# Patient Record
Sex: FEMALE | Race: WHITE
[De-identification: names, ages, dates, MRNs, and addresses within clinical notes are randomized per-mention and may not be internally consistent; named-entity substitution may affect disease eponyms.]

---

## 2017-04-01 NOTE — EDM.PDOC
ED HPI GI/ABDOMINAL





- General


Chief Complaint: Gastrointestinal Problem


Stated Complaint: SOB, FLU SYMPTOMS, DIZZY


Time Seen by Provider: 17 11:06


Source: Reports: Patient, Family, RN notes reviewed


History Limitations: Reports: No limitations





- History of Present Illness


INITIAL COMMENTS - FREE TEXT/NARRATIVE: 





76-year-old female presents emergency department a complaint of diarrhea she's 

been ill for the last 4 days has had loose watery stools on and off been 

feverish initially had upper respiratory type symptoms but that now has 

resolved her biggest concern is dehydration





- Related Data


Allergies/ADRs: 


 Allergies











Allergy/AdvReac Type Severity Reaction Status Date / Time


 


gluten Allergy  Diarrhea Verified 16 09:39


 


ibuprofen [From Motrin] Allergy  Hives Verified 16 09:39


 


Iodinated Contrast Media - Allergy  Hives Verified 16 09:39





Oral and     





[Iodinated Contrast Media -     





IV Dye]     


 


nifedipine [From Procardia] Allergy  Redness Verified 16 09:39











Home Meds: 


 Home Meds





Acetaminophen/Diphenhydramine [Tylenol Pm Ex-Strength Caplet] 1 tab PO BEDTIME 

PRN 16 [History]


Bismuth Subsalicylate [Pepto-Bismol To-Go] 2 tab PO ASDIRECTED PRN 16 [

History]


Calcium Carbonate/Vitamin D3 [Calcium 500 + Vit D 400] 1 tab PO BID 16 [

History]


Cholecalciferol (Vitamin D3) [Vitamin D3] 1,000 units PO DAILY 16 [History

]


Clopidogrel [Plavix] 75 mg PO QPM 16 [History]


Flaxseed 2 tbsp PO DAILY 16 [History]


Loperamide [Imodium] 2 mg PO ASDIRECTED PRN 16 [History]


Multivitamin [Multivitamins] 1 cap PO QPM 16 [History]


Simvastatin [Zocor] 40 mg PO QPM 16 [History]


Triamcinolone Acetonide 1 applic TOP BID 16 [History]


Triamterene/Hydrochlorothiazid [Triamterene-HCTZ 37.5-25 MG] 1 tab-cap PO DAILY 

16 [History]


Pantoprazole Sodium [Protonix] 40 mg PO BID 16 [History]











Past Medical History


HEENT History: Reports: Cataract, Impaired vision, Other (see below)


Other HEENT History: left hear decreased hearing


Cardiovascular History: Reports: High cholesterol, Hypertension, SOB on exertion

, Other (see below)


Other Cardiovascular History: skip beat now and then rhythm issue, dependent 

cyanosis of feet bilat- no dx of PVD


Gastrointestinal History: Reports: Cholelithiasis, Chronic diarrhea, GERD, 

Hemorrhoids, Other (see below)


Other Gastrointestinal History: microscopic collitis- unsure which of 2 types


OB/GYN History: Reports: Pregnancy, Spontaneous 


Musculoskeletal History: Reports: Arthritis


Neurological History: Reports: Migraines, TIA


Endocrine/Metabolic History: Reports: Vitamin D deficiency, Other (see below)


Other Endocrine/Metabolic History: prediabetic per pt.


Oncologic (Cancer) History: Reports: Squamous cell carcinoma, Other (see below)


Other Oncologic History: basal cell, abnormal pap and hsterectomy (unkown if CA)


Dermatologic History: Reports: Urticaria, Other (see below)


Other Dermatologic History: unknown skin issue on head that comes and goes





- Infectious Disease History


Infectious Disease History: Reports: Measles, Pertussis (whooping cough)





- Past Surgical History


HEENT Surgical History: Reports: Cataract surgery


Cardiovascular Surgical History: Reports: None


GI Surgical History: Reports: Appendectomy, Cholecystectomy, Colonoscopy, EGD


Female  Surgical History: Reports: Hysterectomy


Neurological Surgical History: Reports: None


Musculoskeletal Surgical History: Reports: Other (see below)


Other Musculoskeletal Surgeries/Procedures:: hand surgery both pikey fingers


Dermatological Surgical History: Reports: Other (see below)





Social & Family History





- Family History


Cardiac: Reports: Bypass, [place]


Endocrine/Metabolic: Reports: IDDM





- Tobacco Use


Smoking Status *Q: Never Smoker





- Alcohol Use


Days Per Week of Alcohol Use: 5


Number of Drinks Per Day: 2


Total Drinks Per Week: 10





- Recreational Drug Use


Recreational Drug Use: No





ED ROS GENERAL





- Review of Systems


Review Of Systems: See Below


Constitutional: Reports: fever


HEENT: Reports: No symptoms


Respiratory: Reports: No Symptoms


Cardiovascular: Reports: No symptoms


GI/Abdominal: Reports: Diarrhea.  Denies: Nausea, Vomiting


: Reports: no symptoms


Musculoskeletal: Reports: no symptoms


Skin: Reports: no symptoms





ED EXAM, GI/ABD





- Physical Exam


Exam: See Below


Text/Narrative:: 





General: Female, not in any distress, alert and oriented x3 HEENT: head is 

atraumatic normocephalic, eyes pupils equal round reactive to light, sclera 

clear no conjunctivitis appreciated.  Ears tympanic membrane PE tube in place 

open and functioning on the left, and brain clear gray on the right.  Nose no 

septal deviation, nares are clear, no blood present.  Mouth mucosa is moist and 

pink no erythema or exudate noted in soft palate, tongue is midline uvula is 

midline, dentition is intact.


Neck: Supple no thyromegaly no tracheal deviation.


Nodes: Cervical nodes subclavicular nodes nontender no palpable lymphadenopathy 

noted.


Lungs: clear to auscultation bilaterally with symmetrical respirations, no 

adventitious noise appreciated.


CV: Regular rate and rhythm S1 and S2 appreciated no murmurs rubs or gallops 

noted.


Abdomen: Soft, nontender, no palpable masses or organomegaly appreciated, no 

distention no guarding bowel sounds are present,  .


Neuro: Cranial nerves II through XII grossly intact


Skin: Warm and dry, intact


Extremities: No lower extremity edema appreciated.





Course





- Vital Signs


Last Recorded V/S: 


 Last Vital Signs











Temp  98.1 F   17 10:26


 


Pulse  64   17 14:50


 


Resp  22 H  17 14:50


 


BP  162/105 H  17 14:50


 


Pulse Ox  97   17 14:50














- Orders/Labs/Meds


Orders: 


 Active Orders 24 hr











 Category Date Time Status


 


 Peripheral IV Care [RC] .AS DIRECTED Care  17 12:38 Active


 


 CLOSTRIDIUM DIFFICILE BY PCR [RM] Stat Lab  17 14:20 Ordered


 


 CULTURE STOOL + SHIGATOX [RM] Stat Lab  17 14:21 Ordered


 


 UA W/MICROSCOPIC [URIN] Urgent Lab  17 14:47 Uncollected


 


 WBC, STOOL [OP] Stat Lab  17 14:21 Ordered


 


 Sodium Chloride 0.9% [Normal Saline] 1,000 ml Med  17 14:00 Active





 IV ASDIRECTED   


 


 Sodium Chloride 0.9% [Saline Flush] Med  17 12:38 Active





 10 ml FLUSH ASDIRECTED PRN   


 


 Peripheral IV Insertion Adult [OM.PC] Urgent Oth  17 12:38 Ordered








 Medication Orders





Sodium Chloride (Normal Saline)  1,000 mls @ 500 mls/hr IV ASDIRECTED SKY


   Last Admin: 17 14:17  Dose: 500 mls/hr


Sodium Chloride (Saline Flush)  10 ml FLUSH ASDIRECTED PRN


   PRN Reason: Keep Vein Open


   Last Admin: 17 12:49  Dose: 10 ml








Labs: 


 Laboratory Tests











  17 Range/Units





  11:33 11:33 


 


WBC   7.5  (4.5-11.0)  K/uL


 


RBC   4.69  (3.30-5.50)  M/uL


 


Hgb   15.4 H  (12.0-15.0)  g/dL


 


Hct   44.2  (36.0-48.0)  %


 


MCV   94  (80-98)  fL


 


MCH   33 H  (27-31)  pg


 


MCHC   35  (32-36)  %


 


Plt Count   157  (150-400)  K/uL


 


Neut % (Auto)   56  (36-66)  %


 


Lymph % (Auto)   31  (24-44)  %


 


Mono % (Auto)   12 H  (2-6)  %


 


Eos % (Auto)   1 L  (2-4)  %


 


Baso % (Auto)   1  (0-1)  %


 


Sodium  135 L   (140-148)  mmol/L


 


Potassium  3.7   (3.6-5.2)  mmol/L


 


Chloride  98 L   (100-108)  mmol/L


 


Carbon Dioxide  26   (21-32)  mmol/L


 


Anion Gap  14.7 H   (5.0-14.0)  mmol/L


 


BUN  16   (7-18)  mg/dL


 


Creatinine  1.1 H   (0.6-1.0)  mg/dL


 


Est Cr Clr Drug Dosing  39.46   mL/min


 


Estimated GFR (MDRD)  48 L   (>60)  


 


Glucose  107 H   ()  mg/dL


 


Calcium  8.6   (8.5-10.1)  mg/dL


 


Total Bilirubin  0.3   (0.2-1.0)  mg/dL


 


AST  21   (15-37)  U/L


 


ALT  23   (12-78)  U/L


 


Alkaline Phosphatase  52   ()  U/L


 


Total Protein  7.3   (6.4-8.2)  g/dL


 


Albumin  3.7   (3.4-5.0)  g/dL


 


Globulin  3.6 H   (2.3-3.5)  g/dL


 


Albumin/Globulin Ratio  1.0 L   (1.2-2.2)  











Meds: 


Medications











Generic Name Dose Route Start Last Admin





  Trade Name [place]  PRN Reason Stop Dose Admin


 


Sodium Chloride  1,000 mls @ 500 mls/hr  17 14:00  17 14:17





  Normal Saline  IV   500 mls/hr





  ASDIRECTED SKY   Administration


 


Sodium Chloride  10 ml  17 12:38  17 12:49





  Saline Flush  FLUSH   10 ml





  ASDIRECTED PRN   Administration





  Keep Vein Open   














Discontinued Medications














Generic Name Dose Route Start Last Admin





  Trade Name [place]uzy  PRN Reason Stop Dose Admin


 


Sodium Chloride  1,000 mls @ 999 mls/hr  17 12:38  17 12:49





  Normal Saline  IV  17 13:38  999 mls/hr





  .BOLUS ONE   Administration














Departure





- Departure


Time of Disposition: 15:34


Disposition: Admitted As Inpatient 64


Condition: fair


Clinical Impression: 


 Weakness





Diarrhea


Qualifiers:


 Diarrhea type: presumed infectious Qualified Code(s): A09 - Infectious 

gastroenteritis and colitis, unspecified





Forms:  ED Department Discharge





- My Orders


Last 24 Hours: 


My Active Orders





17 12:38


Peripheral IV Care [RC] .AS DIRECTED 


Sodium Chloride 0.9% [Saline Flush]   10 ml FLUSH ASDIRECTED PRN 


Peripheral IV Insertion Adult [OM.PC] Urgent 





17 14:00


Sodium Chloride 0.9% [Normal Saline] 1,000 ml IV ASDIRECTED 





17 14:20


CLOSTRIDIUM DIFFICILE BY PCR [RM] Stat 





17 14:21


CULTURE STOOL + SHIGATOX [RM] Stat 


WBC, STOOL [OP] Stat 





17 14:47


UA W/MICROSCOPIC [URIN] Urgent 














- Assessment/Plan


Last 24 Hours: 


My Active Orders





17 12:38


Peripheral IV Care [RC] .AS DIRECTED 


Sodium Chloride 0.9% [Saline Flush]   10 ml FLUSH ASDIRECTED PRN 


Peripheral IV Insertion Adult [OM.PC] Urgent 





17 14:00


Sodium Chloride 0.9% [Normal Saline] 1,000 ml IV ASDIRECTED 





17 14:20


CLOSTRIDIUM DIFFICILE BY PCR [RM] Stat 





17 14:21


CULTURE STOOL + SHIGATOX [RM] Stat 


WBC, STOOL [OP] Stat 





17 14:47


UA W/MICROSCOPIC [URIN] Urgent 











Plan: 





Assessment





Acuity = acute





Site and laterality = gastroenteritis





Etiology  = suspect viral cause





Manifestations = severe diarrhea, weakness





Location of injury =  home





Lab values = sodium of 135 consistent hyponatremia creatinine elevated at 1.1 

consistent acute renal failure stage GIIIB, stool culture and urine culture 

awaiting sample





Plan


discussed the case with hospitalist on call he agreed to come and evaluate the 

patient in the for further evaluation admission, she is so weak she cannot 

ambulate around the ED

















Patient was in agreement with the plan all questions were answered   This note 

was dictated using dragon voice recognition software please call with any 

questions.

## 2017-04-01 NOTE — PCM.HP
H&P History of Present Illness





- General


Date of Service: 17


Admit Problem/Dx: 


 Admission Diagnosis/Problem





Admission Diagnosis/Problem      Diarrhea








Source of Information: Patient, Family, Provider


History Limitations: Reports: No limitations





- History of Present Illness


Initial Comments - Free Text/Narative: 





Leonarda presents to the emergency room with diarrhea for 2 days and weakness.  

She reports initial onset of symptoms including mild sore throat and nasal 

congestion with mild cough.  Later in the day she developed diarrhea and has 

had diarrhea for the past 2 days.  She thinks that she has had 7 watery bowel 

movements during that time.  She has had very little to eat or drink and has 

become progressively weak.  She has a mild frontal headache that is achy in 

nature.  She hasn't taken anything at home to make it feel better.  Pain has 

been present and essentially unchanged for the last 2 days.  No obvious 

triggers to make it worse.  She's not aware of any sick contacts.  She has had 

subjective fevers and chills but has not measured any temperatures.  No 

dominant pain, nausea or vomiting.


Workup in the emergency room has been reassuring.  She is too weak to be safe 

for outpatient management so she will be admitted for hydration and observation.








- Related Data


Allergies/Adverse Reactions: 


 Allergies











Allergy/AdvReac Type Severity Reaction Status Date / Time


 


gluten Allergy  Diarrhea Verified 16 09:39


 


ibuprofen [From Motrin] Allergy  Hives Verified 16 09:39


 


Iodinated Contrast Media - Allergy  Hives Verified 16 09:39





Oral and     





[Iodinated Contrast Media -     





IV Dye]     


 


nifedipine [From Procardia] Allergy  Redness Verified 16 09:39











Home Medications: 


 Home Meds





Acetaminophen/Diphenhydramine [Tylenol Pm Ex-Strength Caplet] 1 tab PO BEDTIME 

PRN 16 [History]


Bismuth Subsalicylate [Pepto-Bismol To-Go] 2 tab PO ASDIRECTED PRN 16 [

History]


Calcium Carbonate/Vitamin D3 [Calcium 500 + Vit D 400] 1 tab PO BID 16 [

History]


Cholecalciferol (Vitamin D3) [Vitamin D3] 1,000 units PO DAILY 16 [History

]


Clopidogrel [Plavix] 75 mg PO QPM 16 [History]


Flaxseed 2 tbsp PO DAILY 16 [History]


Loperamide [Imodium] 2 mg PO ASDIRECTED PRN 16 [History]


Multivitamin [Multivitamins] 1 cap PO QPM 16 [History]


Simvastatin [Zocor] 40 mg PO QPM 16 [History]


Triamcinolone Acetonide 1 applic TOP BID 16 [History]


Triamterene/Hydrochlorothiazid [Triamterene-HCTZ 37.5-25 MG] 1 tab-cap PO DAILY 

16 [History]


Pantoprazole Sodium [Protonix] 40 mg PO BID 16 [History]











Past Medical History


HEENT History: Reports: Cataract, Impaired vision, Other (see below)


Other HEENT History: left hear decreased hearing


Cardiovascular History: Reports: High cholesterol, Hypertension, SOB on exertion

, Other (see below)


Other Cardiovascular History: skip beat now and then rhythm issue, dependent 

cyanosis of feet bilat- no dx of PVD


Gastrointestinal History: Reports: Cholelithiasis, Chronic diarrhea, GERD, 

Hemorrhoids, Other (see below)


Other Gastrointestinal History: microscopic collitis- unsure which of 2 types


OB/GYN History: Reports: Pregnancy, Spontaneous 


Musculoskeletal History: Reports: Arthritis


Neurological History: Reports: Migraines, TIA


Endocrine/Metabolic History: Reports: Vitamin D deficiency, Other (see below)


Other Endocrine/Metabolic History: prediabetic per pt.


Oncologic (Cancer) History: Reports: Squamous cell carcinoma, Other (see below)


Other Oncologic History: basal cell, abnormal pap and hsterectomy (unkown if CA)


Dermatologic History: Reports: Urticaria, Other (see below)


Other Dermatologic History: unknown skin issue on head that comes and goes





- Infectious Disease History


Infectious Disease History: Reports: Measles, Pertussis (whooping cough)





- Past Surgical History


HEENT Surgical History: Reports: Cataract surgery


Cardiovascular Surgical History: Reports: None


GI Surgical History: Reports: Appendectomy, Cholecystectomy, Colonoscopy, EGD


Female  Surgical History: Reports: Hysterectomy


Neurological Surgical History: Reports: None


Musculoskeletal Surgical History: Reports: Other (see below)


Other Musculoskeletal Surgeries/Procedures:: hand surgery both pikey fingers


Dermatological Surgical History: Reports: Other (see below)





Social & Family History





- Family History


Cardiac: Reports: Bypass, [place]


Endocrine/Metabolic: Reports: IDDM





- Tobacco Use


Smoking Status *Q: Never Smoker





- Alcohol Use


Days Per Week of Alcohol Use: 5


Number of Drinks Per Day: 2


Total Drinks Per Week: 10





- Recreational Drug Use


Recreational Drug Use: No





H&P Review of Systems





- Review of Systems:


Review Of Systems: See Below


Free Text/Narrative: 





A complete 12 point review of systems was obtained.  Pertinent positives and 

negatives are noted in the history of present illness.  All other systems were 

reviewed and were negative except as noted.





Exam





- Exam


Exam: See Below





- Vital Signs


Vital Signs: 


 Last Vital Signs











Temp  36.7 C   17 10:26


 


Pulse  64   17 14:50


 


Resp  22 H  17 14:50


 


BP  162/105 H  17 14:50


 


Pulse Ox  97   17 14:50











Weight: 66.678 kg





- Exam


Quality Assessment: No: supplemental oxygen, urinary catheter


General: alert, oriented, cooperative, mild distress


HEENT: Conjunctiva clear, Posterior pharynx clear.  No: Mucosa moist & pink (dry

), Scleral icterus


Neck: supple, trachea midline.  No: lymphadenopathy, thyromegaly


Lungs: Clear to auscultation, Normal respiratory effort


Cardiovascular: regular rate, regular rhythm.  No: systolic murmur


Abdomen: soft, hyperactive bowel sounds.  No: distention, tenderness


Back Exam: normal inspection, full range of motion


Extremities: normal inspection, normal pulses.  No: edema


Skin: warm, dry


Neuro Extensive - Mental Status: alert, oriented x3


Neuro Extensive - Motor, Sensory, Reflexes: CN II-XII intact.  No: dysarthria, 

abnormal motor, tremor


Psychiatric: alert, normal affect





- Patient Data


Lab Results last 24 hrs: 


 Laboratory Results - last 24 hr











  17 Range/Units





  11:33 11:33 


 


WBC   7.5  (4.5-11.0)  K/uL


 


RBC   4.69  (3.30-5.50)  M/uL


 


Hgb   15.4 H  (12.0-15.0)  g/dL


 


Hct   44.2  (36.0-48.0)  %


 


MCV   94  (80-98)  fL


 


MCH   33 H  (27-31)  pg


 


MCHC   35  (32-36)  %


 


Plt Count   157  (150-400)  K/uL


 


Neut % (Auto)   56  (36-66)  %


 


Lymph % (Auto)   31  (24-44)  %


 


Mono % (Auto)   12 H  (2-6)  %


 


Eos % (Auto)   1 L  (2-4)  %


 


Baso % (Auto)   1  (0-1)  %


 


Sodium  135 L   (140-148)  mmol/L


 


Potassium  3.7   (3.6-5.2)  mmol/L


 


Chloride  98 L   (100-108)  mmol/L


 


Carbon Dioxide  26   (21-32)  mmol/L


 


Anion Gap  14.7 H   (5.0-14.0)  mmol/L


 


BUN  16   (7-18)  mg/dL


 


Creatinine  1.1 H   (0.6-1.0)  mg/dL


 


Est Cr Clr Drug Dosing  39.46   mL/min


 


Estimated GFR (MDRD)  48 L   (>60)  


 


Glucose  107 H   ()  mg/dL


 


Calcium  8.6   (8.5-10.1)  mg/dL


 


Total Bilirubin  0.3   (0.2-1.0)  mg/dL


 


AST  21   (15-37)  U/L


 


ALT  23   (12-78)  U/L


 


Alkaline Phosphatase  52   ()  U/L


 


Total Protein  7.3   (6.4-8.2)  g/dL


 


Albumin  3.7   (3.4-5.0)  g/dL


 


Globulin  3.6 H   (2.3-3.5)  g/dL


 


Albumin/Globulin Ratio  1.0 L   (1.2-2.2)  











Result Diagrams: 


 17 11:33





 17 11:33





*Q Meaningful Use (ADM)





- VTE *Q


VTE Criteria *Q: 








- Stroke *Q


Stroke Criteria *Q: 








- AMI *Q


AMI Criteria *Q: 








- Problem List


(1) Enteritis


SNOMED Code(s): 46837841


   ICD Code: K52.9 - NONINFECTIVE GASTROENTERITIS AND COLITIS, UNSPECIFIED   

Status: Acute   Current Visit: Yes   





(2) Weakness


SNOMED Code(s): 14757542


   ICD Code: R53.1 - WEAKNESS   Status: Acute   Current Visit: Yes   


Problem List Initiated/Reviewed/Updated: Yes


Orders Last 24hrs: 


 Active Orders 24 hr











 Category Date Time Status


 


 Patient Status Manage Transfer [TRANSFER] Routine ADT  17 16:00 Ordered


 


 Peripheral IV Care [RC] .AS DIRECTED Care  17 12:38 Active


 


 CLOSTRIDIUM DIFFICILE BY PCR [RM] Stat Lab  17 14:20 Ordered


 


 CULTURE STOOL + SHIGATOX [RM] Stat Lab  17 14:21 Ordered


 


 UA W/MICROSCOPIC [URIN] Urgent Lab  17 14:47 Uncollected


 


 WBC, STOOL [OP] Stat Lab  17 14:21 Ordered


 


 Sodium Chloride 0.9% [Normal Saline] 1,000 ml Med  17 14:00 Active





 IV ASDIRECTED   


 


 Sodium Chloride 0.9% [Saline Flush] Med  17 12:38 Active





 10 ml FLUSH ASDIRECTED PRN   


 


 Peripheral IV Insertion Adult [OM.PC] Urgent Oth  17 12:38 Ordered


 


 Resuscitation Status Routine Resus Stat  17 16:02 Ordered








 Medication Orders





Sodium Chloride (Normal Saline)  1,000 mls @ 500 mls/hr IV ASDIRECTED SKY


   Last Admin: 17 14:17  Dose: 500 mls/hr


Sodium Chloride (Saline Flush)  10 ml FLUSH ASDIRECTED PRN


   PRN Reason: Keep Vein Open


   Last Admin: 17 12:49  Dose: 10 ml








Assessment/Plan Comment:: 





Assessment and plan - 





Enteritis - viral-type picture with suspected with upper respiratory symptoms 

and diarrhea.  Laboratory studies are essentially normal.  She was not febrile.

  Exam is relatively benign.  With her dehydration and weakness she was not 

safe for outpatient management.  Urine sample is pending.  Stool studies 

ordered but with no recent antibiotics I think C. difficile infection is very 

unlikely.  I would anticipate improvement with hydration and symptom management.


-IV fluids


-Antinausea medications


-Imodium if she has additional diarrhea


-CT scan if condition worsens





Essential hypertension - Hold diuretic antihypertensives with dehydration, 

reassess in the morning





Maintenance issues - 


- DVT prophylaxis - mechanical


- GI prophylaxis - PPI


- Nutrition - regular diet


- Hardy catheter - not indicated





CODE STATUS - full code





Admission justification - the patient will be referred observation status for 

hydration and symptom management overnight





Disposition - anticipate discharge to home tomorrow





Primary care physician - Dr Luis Flacon M.D.

## 2017-04-02 NOTE — [id number].[id number]
**Discharge Summary





- Hospital Course


Brief History: 76-year-old female with history of hypertension and gluten 

intolerance who presented with diarrhea and weakness and was admitted for 

hydration and management of viral gastroenteritis.





- Discharge Data


Discharge Date: 04/02/17


Discharge Disposition: Home, Self-Care 01


Condition: Good





- Discharge Diagnosis/Problem(s)


(1) Enteritis


SNOMED Code(s): 35110871


   ICD Code: K52.9 - NONINFECTIVE GASTROENTERITIS AND COLITIS, UNSPECIFIED   

Status: Acute   





(2) Weakness


SNOMED Code(s): 31282568


   ICD Code: R53.1 - WEAKNESS   Status: Acute   





- Patient Summary/Data


Hospital Course: 





Leonarda presented to the emergency room with 2 days of diarrhea and generalized 

weakness.  Workup in the emergency room was reassuring the patient was 

extremely weak and was admitted for hydration and observation.  There were no 

acute events overnight following admission.  She did receive gentle IV fluids 

overnight.  The morning after admission she continues to tolerate her diet well 

with no nausea, vomiting or abdominal pain.  She has not had any fevers and has 

not had any diarrhea.  Her strength has improved but she's not yet back to 

baseline.  She does feel comfortable going home at this point after her 

hydration and resolution of the diarrhea.  She did receive a single dose of 

potassium chloride for mild hypokalemia.  I suspect that the infection was 

viral in nature.  She does have some nasal congestion and I recommended that 

she use Afrin.  She will followup of her symptoms do not continue to get better 

or they get worse.





- Patient Instructions


Diet: Regular Diet as Tolerated (soft and bland foods for the next few days), 

Drink 8-10+ Glasses/Day


Activity: As Tolerated


Driving: [place] Drive Today


Showering/Bathing: May Shower


Notify Provider of: Fever, Increased Pain, Nausea and/or Vomiting


Other/Special Instructions: 1. You were in the hospital for management of 

diarrhea and weakness presumed to be caused by a viral infection.  The diarrhea 

seems to have resolved without any intervention.  Your strength is improving 

with hydration.  You could consider using oxymetazoline (Afrin) nasal spray to 

help with your congestion.  You can use this twice daily and I would recommend 

using it at bedtime and in the morning when you wake up.  If you have 

additional episodes of diarrhea you can continue to use the Imodium.  Please do 

your best to stay hydrated and you could consider using sports drinks such as 

Gatorade or Powerade to help maintain hydration but often times water it is 

sufficient.  2. Please seek medical attention if you develop fever greater than 

101, persistent nausea with vomiting or severe abdominal pain or diarrhea but 

do not improve with Imodium.





- Discharge Plan


Home Medications: 


 Home Meds





Acetaminophen/Diphenhydramine [Tylenol Pm Ex-Strength Caplet] 1 tab PO BEDTIME 

PRN 05/16/16 [History]


Bismuth Subsalicylate [Pepto-Bismol To-Go] 2 tab PO ASDIRECTED PRN 05/16/16 [

History]


Calcium Carbonate/Vitamin D3 [Calcium 500 + Vit D 400] 1 tab PO BID 05/16/16 [

History]


Cholecalciferol (Vitamin D3) [Vitamin D3] 1,000 units PO DAILY 05/16/16 [History

]


Clopidogrel [Plavix] 75 mg PO QPM 05/16/16 [History]


Flaxseed 2 tbsp PO DAILY 05/16/16 [History]


Loperamide [Imodium] 2 mg PO ASDIRECTED PRN 05/16/16 [History]


Multivitamin [Multivitamins] 1 cap PO QPM 05/16/16 [History]


Simvastatin [Zocor] 40 mg PO QPM 05/16/16 [History]


Triamcinolone Acetonide 1 applic TOP BID 05/16/16 [History]


Triamterene/Hydrochlorothiazid [Triamterene-HCTZ 37.5-25 MG] 1 tab-cap PO DAILY 

05/16/16 [History]


Pantoprazole Sodium [Protonix] 40 mg PO ACBREAKFAST 05/18/16 [History]








Patient Handouts:  Viral Gastroenteritis, Adult


Referrals: 


Pb Smith MD [Primary Care Provider] -  (f/u if your symptoms do not 

continue to improve or they get worse)





- Discharge Summary/Plan Comment


DC Time >30 min.: No (25)





- Patient Data


Vitals - Most Recent: 


 Last Vital Signs











Temp  36.1 C   04/02/17 08:45


 


Pulse  58 L  04/02/17 08:45


 


Resp  18   04/02/17 08:45


 


BP  118/65   04/02/17 08:45


 


Pulse Ox  97   04/02/17 08:45











Weight - Most Recent: 66.678 kg


I&O - Last 24 hours: 


 Intake & Output











 04/01/17 04/02/17 04/02/17





 22:59 06:59 14:59


 


Intake Total  1627 


 


Output Total 750 1300 


 


Balance -750 327 











Lab Results - Last 24 hrs: 


 Laboratory Results - last 24 hr











  04/01/17 04/02/17 04/02/17 Range/Units





  16:30 05:39 05:39 


 


WBC   5.6   (4.5-11.0)  K/uL


 


RBC   4.07   (3.30-5.50)  M/uL


 


Hgb   12.7  D   (12.0-15.0)  g/dL


 


Hct   38.3   (36.0-48.0)  %


 


MCV   94   (80-98)  fL


 


MCH   31   (27-31)  pg


 


MCHC   33   (32-36)  %


 


Plt Count   138 L   (150-400)  K/uL


 


Sodium    143  (140-148)  mmol/L


 


Potassium    3.2 L  (3.6-5.2)  mmol/L


 


Chloride    111 H  (100-108)  mmol/L


 


Carbon Dioxide    25  (21-32)  mmol/L


 


Anion Gap    10.2  (5.0-14.0)  mmol/L


 


BUN    11  (7-18)  mg/dL


 


Creatinine    0.9  (0.6-1.0)  mg/dL


 


Est Cr Clr Drug Dosing    48.23  mL/min


 


Estimated GFR (MDRD)    > 60  (>60)  


 


Glucose    85  ()  mg/dL


 


Calcium    7.5 L  (8.5-10.1)  mg/dL


 


Urine Color  Yellow    


 


Urine Appearance  Clear    


 


Urine pH  7.0    (4.5-8.0)  


 


Ur Specific Gravity  1.010    (1.008-1.030)  


 


Urine Protein  Negative    (NEGATIVE)  mg/dL


 


Urine Glucose (UA)  Normal    (NEGATIVE)  mg/dL


 


Urine Ketones  Negative    (NEGATIVE)  mg/dL


 


Urine Occult Blood  Negative    (NEGATIVE)  


 


Urine Nitrite  Negative    (NEGATIVE)  


 


Urine Bilirubin  Negative    (NEGATIVE)  


 


Urine Urobilinogen  Normal    (NORMAL)  mg/dL


 


Ur Leukocyte Esterase  Negative    (NEGATIVE)  


 


Urine RBC  Not seen    (0-5)  


 


Urine WBC  5-10 H    (0-5)  


 


Ur Epithelial Cells  Few    


 


Amorphous Sediment  Not seen    


 


Urine Bacteria  Moderate    


 


Urine Mucus  Not seen    











Med Orders - Current: 


 Current Medications





Acetaminophen (Tylenol)  650 mg PO Q4H PRN


   PRN Reason: Pain (Mild 1-3)/fever


   Last Admin: 04/02/17 06:04 Dose:  650 mg


Clopidogrel Bisulfate (Plavix)  75 mg PO QPM [facility]


   Last Admin: 04/01/17 18:01 Dose:  75 mg


Sodium Chloride (Normal Saline)  1,000 mls @ 125 mls/hr IV ASDIRECTED [facility]


   Last Admin: 04/02/17 00:20 Dose:  125 mls/hr


Loperamide HCl (Imodium)  2 mg PO Q4H PRN


   PRN Reason: Diarrhea


Ondansetron HCl (Zofran Odt)  4 mg PO Q6H PRN


   PRN Reason: Nausea able to take PO


Pantoprazole Sodium (Protonix***)  40 mg PO BID [facility]


   Last Admin: 04/02/17 08:47 Dose:  40 mg


Potassium Chloride (Klor-Con M20)  40 meq PO ONETIME ONE


   Stop: 04/02/17 10:01


Sodium Chloride (Saline Flush)  10 ml FLUSH ASDIRECTED PRN


   PRN Reason: Keep Vein Open


   Last Admin: 04/01/17 12:49 Dose:  10 ml





Discontinued Medications





Sodium Chloride (Normal Saline)  1,000 mls @ 999 mls/hr IV .BOLUS ONE


   Stop: 04/01/17 13:38


   Last Admin: 04/01/17 12:49 Dose:  999 mls/hr


Sodium Chloride (Normal Saline)  1,000 mls @ 500 mls/hr IV ASDIRECTED [facility]


   Last Admin: 04/01/17 14:17 Dose:  500 mls/hr











*Q Meaningful Use (DIS)





- VTE *Q


VTE Criteria *Q: 








- Stroke *Q


Stroke Criteria *Q: 








- AMI *Q


AMI Criteria *Q:

## 2020-02-01 ENCOUNTER — HOSPITAL ENCOUNTER (EMERGENCY)
Dept: HOSPITAL 11 - JP.ED | Age: 80
Discharge: HOME | End: 2020-02-01
Payer: MEDICARE

## 2020-02-01 VITALS — DIASTOLIC BLOOD PRESSURE: 70 MMHG | SYSTOLIC BLOOD PRESSURE: 164 MMHG | HEART RATE: 65 BPM

## 2020-02-01 DIAGNOSIS — Z79.02: ICD-10-CM

## 2020-02-01 DIAGNOSIS — I10: ICD-10-CM

## 2020-02-01 DIAGNOSIS — Z91.041: ICD-10-CM

## 2020-02-01 DIAGNOSIS — R07.89: Primary | ICD-10-CM

## 2020-02-01 DIAGNOSIS — G43.909: ICD-10-CM

## 2020-02-01 DIAGNOSIS — K21.9: ICD-10-CM

## 2020-02-01 DIAGNOSIS — Z86.73: ICD-10-CM

## 2020-02-01 DIAGNOSIS — Z88.8: ICD-10-CM

## 2020-02-01 DIAGNOSIS — Z79.899: ICD-10-CM

## 2020-02-01 DIAGNOSIS — F41.9: ICD-10-CM

## 2020-02-01 DIAGNOSIS — Z85.828: ICD-10-CM

## 2020-02-01 DIAGNOSIS — Z90.710: ICD-10-CM

## 2020-02-01 DIAGNOSIS — E78.00: ICD-10-CM

## 2020-02-01 DIAGNOSIS — I25.2: ICD-10-CM

## 2020-02-01 NOTE — EDM.PDOC
ED HPI GENERAL MEDICAL PROBLEM





- General


Chief Complaint: Cardiovascular Problem


Stated Complaint: PAIN NECK, ARM, AND BACK- SOB


Time Seen by Provider: 20 13:35


Source of Information: Reports: Patient, Family


History Limitations: Reports: No Limitations





- History of Present Illness


INITIAL COMMENTS - FREE TEXT/NARRATIVE: 





79-year-old female comes into the emergency room with intermittent left-sided 

neck pain, intermittent left arm pain and a pressure sensation in her upper 

abdomen and left chest for the past 8 hours.  She has been having intermittent 

chest pains, had a Cardiolite stress test earlier this week which was 

inconclusive and has a cardiology consultation next week.  She has been 

extremely anxious.  This morning she got up and her legs felt weak, she started 

to become worried and anxious and then developed the vague left chest and back 

discomfort, left neck discomfort but no shortness of breath or nausea.  No 

diaphoresis.  After a few hours of worrying about this, she called her daughter 

and brought her in.  An EKG was done on arrival which showed a left bundle 

branch block consistent with her EKG done 4 days ago.


Onset: Sudden


Duration: Hour(s): (Symptoms started 7 hours ago, she felt fine yesterday)


Location: Reports: Neck, Chest, Upper Extremity, Left


Quality: Reports: Ache





- Related Data


 Allergies











Allergy/AdvReac Type Severity Reaction Status Date / Time


 


ibuprofen [From Motrin] Allergy  Hives Verified 20 13:28


 


Iodinated Contrast Media Allergy  Hives Verified 20 13:28





[Iodinated Contrast Media -     





IV Dye]     


 


nifedipine [From Procardia] Allergy  Redness Verified 20 13:28


 


gluten AdvReac  Diarrhea Verified 20 13:28











Home Meds: 


 Home Meds





Bismuth Subsalicylate [Pepto-Bismol To-Go] 2 tab PO ASDIRECTED PRN 16 [

History]


Calcium Carbonate/Vitamin D3 [Calcium 500 + Vit D 400] 1 tab PO BID 16 [

History]


Cholecalciferol (Vitamin D3) [Vitamin D3] 1,000 units PO DAILY 16 [History

]


Clopidogrel [Plavix] 75 mg PO QPM 16 [History]


Flaxseed 2 tbsp PO DAILY 16 [History]


Loperamide [Imodium] 2 mg PO ASDIRECTED PRN 16 [History]


Multivitamin [Multivitamins] 1 cap PO QPM 16 [History]


Simvastatin [Zocor] 40 mg PO QPM 16 [History]


Triamcinolone Acetonide 1 applic TOP BID 16 [History]


Triamterene/Hydrochlorothiazid [Triamterene-HCTZ 37.5-25 MG] 1 tab-cap PO DAILY 

16 [History]


Diphenoxylate HCl/Atropine [Diphenoxylate-Atrop 2.5-0.025] 2 tab PO QID PRN  [History]


L. Acidophilus/L.bulgaricus [Lactobacillus Tablet] 1 tab PO DAILY 20 [

History]


Omega-3/DHA/Epa/Fish Oil [Omega 3 500 Softgel] 1 cap PO DAILY 20 [History]


diphenhydrAMINE HCL [Diphenhydramine HCl] 0.5 - 1 tab PO QPM PRN 20 [

History]











Past Medical History


HEENT History: Reports: Cataract, Impaired Vision, Other (See Below)


Other HEENT History: left hear decreased hearing


Cardiovascular History: Reports: High Cholesterol, Hypertension, MI, SOB on 

Exertion, Other (See Below)


Other Cardiovascular History: skip beat now and then rhythm issue, dependent 

cyanosis of feet bilat- no dx of PVD


Gastrointestinal History: Reports: Cholelithiasis, Chronic Diarrhea, GERD, 

Hemorrhoids


Other Gastrointestinal History: microscopic collitis- unsure which of 2 types


OB/GYN History: Reports: Pregnancy, Spontaneous 


Musculoskeletal History: Reports: Arthritis


Neurological History: Reports: Migraines, TIA


Endocrine/Metabolic History: Reports: Vitamin D Deficiency


Other Endocrine/Metabolic History: prediabetic per pt.


Oncologic (Cancer) History: Reports: Squamous Cell Carcinoma


Other Oncologic History: basal cell, abnormal pap and hsterectomy (unkown if CA)


Dermatologic History: Reports: Urticaria, Other (See Below)


Other Dermatologic History: unknown skin issue on head that comes and goes





- Infectious Disease History


Infectious Disease History: Reports: Measles, Pertussis (Whooping Cough)





- Past Surgical History


HEENT Surgical History: Reports: Cataract Surgery


Female  Surgical History: Reports: Hysterectomy





Social & Family History





- Family History


Family Medical History: Noncontributory


Cardiac: Reports: Bypass, MI


Endocrine/Metabolic: Reports: IDDM





- Tobacco Use


Smoking Status *Q: Never Smoker





- Caffeine Use


Caffeine Use: Reports: Coffee





- Alcohol Use


Days Per Week of Alcohol Use: 5


Number of Drinks Per Day: 1


Total Drinks Per Week: 5





- Recreational Drug Use


Recreational Drug Use: No





ED ROS GENERAL





- Review of Systems


Review Of Systems: See Below


Constitutional: Denies: Fever, Chills, Malaise


HEENT: Reports: Other (Left jaw and left neck had a dull ache this morning)


Respiratory: Denies: Shortness of Breath, Cough


Cardiovascular: Reports: Chest Pain (Mild left-sided chest pressure)


GI/Abdominal: Reports: Other (Patient is been having a lot of GI symptoms over 

the past several years, including heartburn, increased flatulence, distention 

and gas)


: Reports: No Symptoms


Skin: Reports: No Symptoms


Neurological: Reports: Dizziness.  Denies: Headache


Psychiatric: Reports: Anxiety





ED EXAM, GENERAL





- Physical Exam


Exam: See Below


Exam Limited By: No Limitations


General Appearance: Alert, Anxious


Eye Exam: Bilateral Eye: Normal Inspection


Head: Atraumatic


Neck: Normal Inspection, Non-Tender


Respiratory/Chest: No Respiratory Distress, Lungs Clear


Cardiovascular: Regular Rate, Rhythm, Extra Beats (Occasional extra beats, 

approximately 1/min)


GI/Abdominal: Normal Bowel Sounds, Soft, Non-Tender


Extremities: Normal Inspection.  No: Pedal Edema


Neurological: Alert, Oriented


Psychiatric: Anxious


Skin Exam: Warm, Dry





EKG INTERPRETATION


Rhythm: NSR


QRS: LBBB


Comparison: No Change (No change from 4 days ago)





Course





- Vital Signs


Last Recorded V/S: 


 Last Vital Signs











Temp  95.6 F   20 13:32


 


Pulse  65   20 14:30


 


Resp  13   20 14:30


 


BP  164/70 H  20 14:30


 


Pulse Ox  97   20 14:30














- Orders/Labs/Meds


Orders: 


 Active Orders 24 hr











 Category Date Time Status


 


 EKG Documentation Completion [RC] ASDIRECTED Care  20 13:55 Active


 


 EKG 12 Lead [EK] Routine Ther  20 13:54 Ordered











Labs: 


 Laboratory Tests











  20 Range/Units





  14:09 14:09 


 


WBC  7.3   (4.5-11.0)  K/uL


 


RBC  4.43   (3.30-5.50)  M/uL


 


Hgb  14.2   (12.0-15.0)  g/dL


 


Hct  42.7   (36.0-48.0)  %


 


MCV  96   (80-98)  fL


 


MCH  32 H   (27-31)  pg


 


MCHC  33   (32-36)  %


 


Plt Count  160   (150-400)  K/uL


 


Neut % (Auto)  60   (36-66)  %


 


Lymph % (Auto)  31   (24-44)  %


 


Mono % (Auto)  8 H   (2-6)  %


 


Eos % (Auto)  1 L   (2-4)  %


 


Baso % (Auto)  0   (0-1)  %


 


Sodium   140  (140-148)  mmol/L


 


Potassium   3.3 L  (3.6-5.2)  mmol/L


 


Chloride   102  (100-108)  mmol/L


 


Carbon Dioxide   28  (21-32)  mmol/L


 


Anion Gap   13.3  (5.0-14.0)  mmol/L


 


BUN   18  D  (7-18)  mg/dL


 


Creatinine   1.0  (0.6-1.0)  mg/dL


 


Est Cr Clr Drug Dosing   39.70  mL/min


 


Estimated GFR (MDRD)   53 L  (>60)  


 


Glucose   86  ()  mg/dL


 


Calcium   9.6  D  (8.5-10.1)  mg/dL


 


Troponin I   < 0.017  (0.000-0.056)  ng/mL














- Re-Assessments/Exams


Free Text/Narrative Re-Assessment/Exam: 





20 13:59


EKG was stable from 4 days ago.  Patient was very anxious with moderately 

elevated blood pressure, but no other objective findings such as diaphoresis or 

shortness of breath.  CBC, BMP and troponin were drawn.  She was kept on 

cardiac monitoring pending lab results.


20 14:39


Patient was monitored in the ER for an hour while awaiting labs, she calmed 

down and blood pressure normalized.  No further symptoms.  Labs were reassuring

, troponin was 0.  No reason for hospitalization or more urgent consultation, 

she can keep her appointment next week as planned and return if worsening or 

concerns





Departure





- Departure


Time of Disposition: 14:54


Disposition: Home, Self-Care 01


Clinical Impression: 


 Atypical chest pain, Anxiety about health





Instructions:  Nonspecific Chest Pain


Referrals: 


Pb Smith MD [Primary Care Provider] - 


Forms:  ED Department Discharge


Care Plan Goals: 


Activity and diet as tolerated, continue any current medications and consider 

restarting antiacid therapy.  Return if worsening or you develop other concerns

, otherwise keep your appointment next week as planned.





Sepsis Event Note





- Evaluation


Sepsis Screening Result: No Definite Risk





- Focused Exam


Vital Signs: 


 Vital Signs











  Temp Pulse Resp BP Pulse Ox


 


 20 14:30   65  13  164/70 H  97


 


 20 13:32  95.6 F  67  12  191/90 H  97


 


 20 13:26  95.6 F  67  12  191/90 H  97


 


 20 13:15   71  14  176/83 H  96











Date Exam was Performed: 20


Time Exam was Performed: 15:35





- My Orders


Last 24 Hours: 


My Active Orders





20 13:54


EKG 12 Lead [EK] Routine 





20 13:55


EKG Documentation Completion [RC] ASDIRECTED 














- Assessment/Plan


Last 24 Hours: 


My Active Orders





20 13:54


EKG 12 Lead [EK] Routine 





20 13:55


EKG Documentation Completion [RC] ASDIRECTED

## 2021-01-30 ENCOUNTER — HOSPITAL ENCOUNTER (EMERGENCY)
Dept: HOSPITAL 11 - JP.ED | Age: 81
Discharge: HOME | End: 2021-01-30
Payer: MEDICARE

## 2021-01-30 VITALS — HEART RATE: 65 BPM

## 2021-01-30 VITALS — SYSTOLIC BLOOD PRESSURE: 173 MMHG | DIASTOLIC BLOOD PRESSURE: 72 MMHG

## 2021-01-30 DIAGNOSIS — Z79.899: ICD-10-CM

## 2021-01-30 DIAGNOSIS — I25.2: ICD-10-CM

## 2021-01-30 DIAGNOSIS — Z79.02: ICD-10-CM

## 2021-01-30 DIAGNOSIS — Z86.73: ICD-10-CM

## 2021-01-30 DIAGNOSIS — Z88.6: ICD-10-CM

## 2021-01-30 DIAGNOSIS — K21.9: ICD-10-CM

## 2021-01-30 DIAGNOSIS — Z91.041: ICD-10-CM

## 2021-01-30 DIAGNOSIS — Z88.8: ICD-10-CM

## 2021-01-30 DIAGNOSIS — I10: Primary | ICD-10-CM

## 2021-01-30 DIAGNOSIS — E78.00: ICD-10-CM

## 2021-01-30 DIAGNOSIS — Z91.048: ICD-10-CM

## 2021-01-30 PROCEDURE — 93005 ELECTROCARDIOGRAM TRACING: CPT

## 2021-01-30 PROCEDURE — 96374 THER/PROPH/DIAG INJ IV PUSH: CPT

## 2021-01-30 PROCEDURE — 99284 EMERGENCY DEPT VISIT MOD MDM: CPT

## 2021-01-30 PROCEDURE — 36415 COLL VENOUS BLD VENIPUNCTURE: CPT

## 2021-01-30 PROCEDURE — 80048 BASIC METABOLIC PNL TOTAL CA: CPT

## 2021-01-30 PROCEDURE — 85027 COMPLETE CBC AUTOMATED: CPT

## 2021-01-30 PROCEDURE — 84484 ASSAY OF TROPONIN QUANT: CPT

## 2021-01-30 NOTE — EDM.PDOC
ED HPI GENERAL MEDICAL PROBLEM





- General


Chief Complaint: Chest Pain


Stated Complaint: MEDICAL VIA NORTH


Time Seen by Provider: 21 08:45


Source of Information: Reports: Patient, Old Records, RN


History Limitations: Reports: No Limitations





- History of Present Illness


INITIAL COMMENTS - FREE TEXT/NARRATIVE: 





79 yo female here with intermittent upper back pain since Thursday relieved with

acetaminophen transiently and intermittent anterior neck pain. No nausea, SOB or

diaphoresis. Has a pHx of "a silent MI" per patient and has seen a cardiologist 

in the past few mos. Arrives via EMS who started a NTG drip en route and gave 

aspirin 324 mg.


Onset: Gradual


Onset Date: 21


Duration: Day(s):, Intermittent, Waxing/Waning


Location: Reports: Neck, Back


Quality: Reports: Ache


Severity: Mild


Improves with: Reports: Medication (acetaminophen)


Worsens with: Reports: Other (unknown)


Context: Reports: Other (See HPI)


Associated Symptoms: Denies: Confusion, Chest Pain, Cough, Fever/Chills, 

Nausea/Vomiting, Shortness of Breath


Treatments PTA: Reports: Aspirin, Nitroglycerin


  ** Chest


Pain Score (Numeric/FACES): 2





- Related Data


                                    Allergies











Allergy/AdvReac Type Severity Reaction Status Date / Time


 


ibuprofen [From Motrin] Allergy  Hives Verified 21 08:39


 


Iodinated Contrast Media Allergy  Hives Verified 21 08:39





[Iodinated Contrast Media -     





IV Dye]     


 


nifedipine [From Procardia] Allergy  Redness Verified 21 08:39


 


gluten AdvReac  Diarrhea Verified 21 08:39











Home Meds: 


                                    Home Meds





Bismuth Subsalicylate [Pepto-Bismol To-Go] 2 tab PO ASDIRECTED PRN 16 [

History]


Calcium Carbonate/Vitamin D3 [Calcium 500 + Vit D 400] 1 tab PO BID 16 

[History]


Cholecalciferol (Vitamin D3) [Vitamin D3] 1,000 units PO DAILY 16 

[History]


Clopidogrel [Plavix] 75 mg PO QPM 16 [History]


Flaxseed 2 tbsp PO DAILY 16 [History]


Multivitamin [Multivitamins] 1 cap PO QPM 16 [History]


Triamcinolone Acetonide 1 applic TOP BID 16 [History]


Diphenoxylate HCl/Atropine [Diphenoxylate-Atrop 2.5-0.025] 2 tab PO QID PRN 

20 [History]


Omega-3/DHA/Epa/Fish Oil [Omega 3 500 Softgel] 1 cap PO DAILY 20 [History]


diphenhydrAMINE HCL [Diphenhydramine HCl] 0.5 - 1 tab PO QPM PRN 20 

[History]


Isosorbide Mononitrate [Imdur] 30 mg PO DAILY 21 [History]


Metoprolol Succinate [Toprol XL] 25 mg PO DAILY 21 [History]


Pantoprazole [ProTONIX***] 40 mg PO DAILY 21 [History]


atorvaSTATin [Lipitor] 40 mg PO BEDTIME 21 [History]


lisinopriL [Lisinopril] 5 mg PO DAILY 21 [History]











Past Medical History


HEENT History: Reports: Cataract, Impaired Vision, Other (See Below)


Other HEENT History: left hear decreased hearing


Cardiovascular History: Reports: High Cholesterol, Hypertension, MI, SOB on 

Exertion, Other (See Below)


Other Cardiovascular History: skip beat now and then rhythm issue, dependent 

cyanosis of feet bilat- no dx of PVD


Gastrointestinal History: Reports: Cholelithiasis, Chronic Diarrhea, GERD, 

Hemorrhoids


Other Gastrointestinal History: microscopic collitis- unsure which of 2 types


OB/GYN History: Reports: Pregnancy, Spontaneous 


Musculoskeletal History: Reports: Arthritis


Neurological History: Reports: Migraines, TIA


Endocrine/Metabolic History: Reports: Vitamin D Deficiency


Other Endocrine/Metabolic History: prediabetic per pt.


Oncologic (Cancer) History: Reports: Squamous Cell Carcinoma


Other Oncologic History: basal cell, abnormal pap and hsterectomy (unkown if CA)


Dermatologic History: Reports: Urticaria, Other (See Below)


Other Dermatologic History: unknown skin issue on head that comes and goes





- Infectious Disease History


Infectious Disease History: Reports: Measles, Pertussis (Whooping Cough)





- Past Surgical History


HEENT Surgical History: Reports: Cataract Surgery


Female  Surgical History: Reports: Hysterectomy





Social & Family History





- Family History


Family Medical History: No Pertinent Family History


Cardiac: Reports: Bypass, MI


Endocrine/Metabolic: Reports: IDDM





- Caffeine Use


Caffeine Use: Reports: Coffee





ED ROS GENERAL





- Review of Systems


Review Of Systems: See Below


Constitutional: Reports: No Symptoms


HEENT: Reports: No Symptoms


Respiratory: Reports: No Symptoms


Cardiovascular: Reports: No Symptoms


GI/Abdominal: Reports: No Symptoms


: Reports: No Symptoms


Musculoskeletal: Reports: Neck Pain (anterior, intermittent), Back Pain (upper)


Skin: Reports: No Symptoms


Neurological: Reports: No Symptoms


Psychiatric: Reports: Anxiety (hx of anxiety)





ED EXAM, GENERAL





- Physical Exam


Exam: See Below


Exam Limited By: No Limitations


General Appearance: Alert, WD/WN, No Apparent Distress


Eye Exam: Bilateral Eye: Normal Inspection


Ears: Normal External Exam, Normal Canal, Hearing Grossly Normal


Ear Exam: Bilateral Ear: Auricle Normal, Canal Normal


Nose: Normal Inspection, No Blood


Throat/Mouth: Normal Inspection, Normal Lips, Normal Oropharynx, Normal Voice, 

No Airway Compromise


Head: Atraumatic, Normocephalic


Neck: Normal Inspection


Respiratory/Chest: No Respiratory Distress, Lungs Clear, Normal Breath Sounds, 

No Accessory Muscle Use, Chest Non-Tender


Cardiovascular: Regular Rate, Rhythm, No Edema


GI/Abdominal: Normal Bowel Sounds, Soft, Non-Tender, No Distention


Extremities: Normal Inspection, Normal Range of Motion, Non-Tender, No Pedal 

Edema.  No: Pedal Edema


Neurological: Alert, Oriented, CN II-XII Intact, Normal Cognition, No 

Motor/Sensory Deficits


Psychiatric: Normal Affect, Normal Mood


Skin Exam: Warm, Dry, Intact, Normal Color, No Rash


  ** #1 Interpretation


EKG Date: 21


Time: 08:30


Rhythm: NSR


Rate (Beats/Min): 63


Axis: Normal


P-Wave: Present


QRS: LBBB


ST-T: Depressed (V6 depression with flipped T's in I, AVL)


QT: Normal


Comparison: Change From Previous EKG (only change is the slight depression in 

V6.)





Course





- Vital Signs


Last Recorded V/S: 


                                Last Vital Signs











Temp  36.5 C   21 08:48


 


Pulse  65   21 08:48


 


Resp  11 L  21 10:00


 


BP  166/76 H  21 10:00


 


Pulse Ox  96   21 10:00














- Orders/Labs/Meds


Orders: 


                               Active Orders 24 hr











 Category Date Time Status


 


 Cardiac Monitoring [RC] .As Directed Care  21 08:31 Active


 


 EKG Documentation Completion [RC] ASDIRECTED Care  21 08:31 Active


 


 UA W/MICROSCOPIC [URIN] Stat Lab  21 08:40 Ordered


 


 Sodium Chloride 0.9% [Saline Flush] Med  21 08:40 Active





 10 ml FLUSH ASDIRECTED PRN   


 


 Saline Lock Insert [OM.PC] Routine Oth  21 08:40 Ordered


 


 EKG 12 Lead [EK] Routine Ther  21 08:31 Ordered








                                Medication Orders





Sodium Chloride (Saline Flush)  10 ml FLUSH ASDIRECTED PRN


   PRN Reason: Keep Vein Open


   Last Admin: 21 08:53  Dose: 10 ml


   Documented by: [email or web address]








Labs: 


                                Laboratory Tests











  21 Range/Units





  08:48 08:48 11:00 


 


WBC  6.0    (4.5-11.0)  K/uL


 


RBC  4.14    (3.30-5.50)  M/uL


 


Hgb  13.4    (12.0-15.0)  g/dL


 


Hct  40.0    (36.0-48.0)  %


 


MCV  97    (80-98)  fL


 


MCH  32 H    (27-31)  pg


 


MCHC  34    (32-36)  %


 


Plt Count  181    (150-400)  K/uL


 


Sodium   138 L   (140-148)  mmol/L


 


Potassium   4.2   (3.6-5.2)  mmol/L


 


Chloride   100   (100-108)  mmol/L


 


Carbon Dioxide   28   (21-32)  mmol/L


 


Anion Gap   14.2 H   (5.0-14.0)  mmol/L


 


BUN   18   (7-18)  mg/dL


 


Creatinine   1.0   (0.6-1.0)  mg/dL


 


Est Cr Clr Drug Dosing   40.38   mL/min


 


Estimated GFR (MDRD)   53 L   (>60)  


 


Glucose   113 H   ()  mg/dL


 


Calcium   9.9   (8.5-10.1)  mg/dL


 


Troponin I   < 0.017  0.017  (0.000-0.056)  ng/mL











Meds: 


Medications











Generic Name Dose Route Start Last Admin





  Trade Name Freq  PRN Reason Stop Dose Admin


 


Sodium Chloride  10 ml  21 08:40  21 08:53





  Saline Flush  FLUSH   10 ml





  ASDIRECTED PRN   Administration





  Keep Vein Open  














Discontinued Medications














Generic Name Dose Route Start Last Admin





  Trade Name Brightq  PRN Reason Stop Dose Admin


 


Acetaminophen  1,000 mg  21 08:59  21 09:42





  Tylenol Extra Strength  PO  21 09:00  1,000 mg





  ONETIME ONE   Administration


 


Aspirin  324 mg  21 08:54  21 08:59





  Aspirin  PO  21 08:55  Not Given





  ONETIME ONE  


 


Ketorolac Tromethamine  15 mg  21 09:28  21 09:42





  Toradol  IVPUSH  21 09:29  15 mg





  ONETIME ONE   Administration


 


Lorazepam  0.5 mg  21 09:27  21 09:45





  Ativan  PO  21 09:28  0.5 mg





  ONETIME ONE   Administration














- Re-Assessments/Exams


Free Text/Narrative Re-Assessment/Exam: 





21 11:36


Feels better after our interventions. BP still too high.


21 11:36








Departure





- Departure


Time of Disposition: 11:45


Disposition: Home, Self-Care 01


Condition: Good


Clinical Impression: 


 HTN, goal below 130/80





Instructions:  Managing Your Hypertension


Referrals: 


PCP,None [Primary Care Provider] - 


Forms:  ED Department Discharge


Additional Instructions: 


Increase your lisinopril to 10 mg daily. Continue all your other medications as 

currently. See your doctor for recheck this next week. 





Sepsis Event Note (ED)





- Focused Exam


Vital Signs: 


                                   Vital Signs











  Temp Pulse Resp BP Pulse Ox


 


 21 10:00    11 L  166/76 H  96


 


 21 09:30    12  176/72 H  97


 


 21 09:00    10 L  174/69 H  95


 


 21 08:48  36.5 C  65  13  163/72 H  97


 


 21 08:40    10 L  170/74 H  96














- My Orders


Last 24 Hours: 


My Active Orders





21 08:31


Cardiac Monitoring [RC] .As Directed 


EKG Documentation Completion [RC] ASDIRECTED 


EKG 12 Lead [EK] Routine 





21 08:40


UA W/MICROSCOPIC [URIN] Stat 


Sodium Chloride 0.9% [Saline Flush]   10 ml FLUSH ASDIRECTED PRN 


Saline Lock Insert [OM.PC] Routine 














- Assessment/Plan


Last 24 Hours: 


My Active Orders





21 08:31


Cardiac Monitoring [RC] .As Directed 


EKG Documentation Completion [RC] ASDIRECTED 


EKG 12 Lead [EK] Routine 





21 08:40


UA W/MICROSCOPIC [URIN] Stat 


Sodium Chloride 0.9% [Saline Flush]   10 ml FLUSH ASDIRECTED PRN 


Saline Lock Insert [OM.PC] Routine

## 2022-02-03 ENCOUNTER — HOSPITAL ENCOUNTER (OUTPATIENT)
Dept: HOSPITAL 11 - JP.SDS | Age: 82
Discharge: HOME | End: 2022-02-03
Attending: SURGERY
Payer: MEDICARE

## 2022-02-03 VITALS — DIASTOLIC BLOOD PRESSURE: 75 MMHG | HEART RATE: 65 BPM | SYSTOLIC BLOOD PRESSURE: 175 MMHG

## 2022-02-03 DIAGNOSIS — I42.9: ICD-10-CM

## 2022-02-03 DIAGNOSIS — Z86.73: ICD-10-CM

## 2022-02-03 DIAGNOSIS — I10: ICD-10-CM

## 2022-02-03 DIAGNOSIS — K29.60: Primary | ICD-10-CM

## 2022-02-03 DIAGNOSIS — K21.9: ICD-10-CM

## 2022-02-03 DIAGNOSIS — I25.10: ICD-10-CM

## 2022-02-03 DIAGNOSIS — K44.9: ICD-10-CM

## 2022-02-03 PROCEDURE — 87081 CULTURE SCREEN ONLY: CPT

## 2022-02-03 PROCEDURE — 43239 EGD BIOPSY SINGLE/MULTIPLE: CPT
